# Patient Record
Sex: MALE | Race: ASIAN | Employment: FULL TIME | URBAN - METROPOLITAN AREA
[De-identification: names, ages, dates, MRNs, and addresses within clinical notes are randomized per-mention and may not be internally consistent; named-entity substitution may affect disease eponyms.]

---

## 2021-06-11 ENCOUNTER — OFFICE VISIT (OUTPATIENT)
Dept: PRIMARY CARE CLINIC | Age: 41
End: 2021-06-11
Payer: COMMERCIAL

## 2021-06-11 VITALS
TEMPERATURE: 98.4 F | HEART RATE: 72 BPM | SYSTOLIC BLOOD PRESSURE: 136 MMHG | WEIGHT: 158.2 LBS | RESPIRATION RATE: 15 BRPM | DIASTOLIC BLOOD PRESSURE: 84 MMHG | HEIGHT: 68 IN | BODY MASS INDEX: 23.98 KG/M2 | OXYGEN SATURATION: 98 %

## 2021-06-11 DIAGNOSIS — E78.2 MIXED HYPERLIPIDEMIA: Primary | ICD-10-CM

## 2021-06-11 DIAGNOSIS — E55.9 VITAMIN D DEFICIENCY: ICD-10-CM

## 2021-06-11 DIAGNOSIS — R03.0 ELEVATED BLOOD PRESSURE READING WITHOUT DIAGNOSIS OF HYPERTENSION: ICD-10-CM

## 2021-06-11 DIAGNOSIS — Z00.00 PHYSICAL EXAM: ICD-10-CM

## 2021-06-11 DIAGNOSIS — Z11.59 NEED FOR HEPATITIS C SCREENING TEST: ICD-10-CM

## 2021-06-11 PROCEDURE — 99204 OFFICE O/P NEW MOD 45 MIN: CPT | Performed by: INTERNAL MEDICINE

## 2021-06-11 NOTE — PROGRESS NOTES
Chief Complaint   Patient presents with   [facility] Patient       Visit Vitals  BP (!) 148/94 (BP 1 Location: Right arm)   Pulse 72   Temp 98.4 °F (36.9 °C)   Resp 15   Ht 5' 8\" (1.727 m)   Wt 158 lb 3.2 oz (71.8 kg)   SpO2 98%   BMI 24.05 kg/m²       1. Have you been to the ER, urgent care clinic since your last visit? Hospitalized since your last visit? No    2. Have you seen or consulted any other health care providers outside of the [address] [place] [address] since your last visit? Include any pap smears or colon screening.  No

## 2021-07-02 ENCOUNTER — OFFICE VISIT (OUTPATIENT)
Dept: PRIMARY CARE CLINIC | Age: 41
End: 2021-07-02
Payer: COMMERCIAL

## 2021-07-02 VITALS
SYSTOLIC BLOOD PRESSURE: 136 MMHG | RESPIRATION RATE: 14 BRPM | HEIGHT: 68 IN | TEMPERATURE: 97.8 F | BODY MASS INDEX: 22.97 KG/M2 | DIASTOLIC BLOOD PRESSURE: 85 MMHG | WEIGHT: 151.6 LBS | OXYGEN SATURATION: 99 % | HEART RATE: 65 BPM

## 2021-07-02 DIAGNOSIS — E03.8 SUBCLINICAL HYPOTHYROIDISM: ICD-10-CM

## 2021-07-02 DIAGNOSIS — E55.9 VITAMIN D DEFICIENCY: ICD-10-CM

## 2021-07-02 DIAGNOSIS — D64.9 ANEMIA, UNSPECIFIED TYPE: ICD-10-CM

## 2021-07-02 DIAGNOSIS — E78.2 MIXED HYPERLIPIDEMIA: Primary | ICD-10-CM

## 2021-07-02 DIAGNOSIS — Z23 NEED FOR DIPHTHERIA-TETANUS-PERTUSSIS (TDAP) VACCINE: ICD-10-CM

## 2021-07-02 PROCEDURE — 99214 OFFICE O/P EST MOD 30 MIN: CPT | Performed by: INTERNAL MEDICINE

## 2021-07-02 NOTE — PROGRESS NOTES
Chief Complaint   Patient presents with    Follow-up     abnormal lad results       Visit Vitals  BP (!) 149/85 (BP 1 Location: Left arm)   Pulse 65   Temp 97.8 °F (36.6 °C)   Resp 14   Ht 5' 8\" (1.727 m)   Wt 151 lb 9.6 oz (68.8 kg)   SpO2 99%   BMI 23.05 kg/m²       1. Have you been to the ER, urgent care clinic since your last visit? Hospitalized since your last visit? No    2. Have you seen or consulted any other health care providers outside of the [address] [place] [address] since your last visit? Include any pap smears or colon screening.  No

## 2021-07-02 NOTE — PROGRESS NOTES
Marnie Baxter (: 1980) is a 47 y.o. male, established patient, here for evaluation of the following chief complaint(s):  Follow-up (abnormal lad results)   Written by Ness Abraham, as dictated by Dr. Christine Forbes MD.      ASSESSMENT/PLAN:  Below is the assessment and plan developed based on review of pertinent history, physical exam, labs, studies, and medications. 1. Mixed hyperlipidemia  Explained that hypothyroidism can cause elevated cholesterol levels. Will check lipid panel in 2 months. Waiting for results. -     LIPID PANEL; Future    2. Anemia, unspecified type  I ordered a stool test for health maintenance. Ordered work to check CBC levels. Waiting for results. -     OCCULT BLOOD IMMUNOASSAY,DIAGNOSTIC; Future  -     CBC W/O DIFF; Future    3. Vitamin D deficiency  Lab work showed low Vitamin D levels. Recommend that patient take a Vitamin D supplement of 2,000 units daily. -     VITAMIN D, 25 HYDROXY; Future    4. Subclinical hypothyroidism  Explained that hypothyroidism can cause elevated cholesterol levels. Will check lipid panel and TSH levels in 2 months. Waiting for results.   -     TSH 3RD GENERATION; Future    5. Need for diphtheria-tetanus-pertussis (Tdap) vaccine  Ordered Tdap vaccine. Will administer at pharmacy. -     diphth,pertus,acell,,tetanus (BOOSTRIX TDAP) 2.5-8-5 Lf-mcg-Lf/0.5mL susp suspension; 0.5 mL by IntraMUSCular route once for 1 dose., Normal, Disp-0.5 mL, R-0 sent to pharmacy. SUBJECTIVE/OBJECTIVE:  HPI  Patient presents today for a routine visit. His BP today is 149/85 and repeat reading came 136/85. He checks his BP at home, and reports readings in 585O systolic with occassional high readings. His most recent lab work showed Vitamin D level at 12.9, LDL at 126.6, HGB at 12.0, and TSH at 4.44. He does not have a fhx of hypothyroidism. He has started running recently for exercise. He is not a vegetarian, and eats mainly chicken for meat.  He denies having fatigued during the day, and sleeps around 9-10 pm.         Past Medical History:   Diagnosis Date    Pneumothorax 2006       History reviewed. No pertinent surgical history. Family History   Problem Relation Age of Onset    Hypertension Mother     Cancer Father         bone cancer    Hypertension Father        Social History     Socioeconomic History    Marital status:      Spouse name: Not on file    Number of children: Not on file    Years of education: Not on file    Highest education level: Not on file   Occupational History    Not on file   Tobacco Use    Smoking status: Former Smoker     Types: Cigarettes    Smokeless tobacco: Never Used   Vaping Use    Vaping Use: Never used   Substance and Sexual Activity    Alcohol use: Yes    Drug use: Never    Sexual activity: Not on file   Other Topics Concern    Not on file   Social History Narrative    Not on file     Social Determinants of Health     Financial Resource Strain:     Difficulty of Paying Living Expenses:    Food Insecurity:     Worried About Running Out of Food in the Last Year:     [address] in the Last Year:    Transportation Needs:     Lack of Transportation (Medical):      Lack of Transportation (Non-Medical):    Physical Activity:     Days of Exercise per Week:     Minutes of Exercise per Session:    Stress:     Feeling of Stress :    Social Connections:     Frequency of Communication with Friends and Family:     Frequency of Social Gatherings with Friends and Family:     Attends Yazidism Services:     Active Member of Clubs or Organizations:     Attends Club or Organization Meetings:     Marital Status:    Intimate Partner Violence:     Fear of Current or Ex-Partner:     Emotionally Abused:     Physically Abused:     Sexually Abused:        Orders Only on 06/18/2021   Component Date Value Ref Range Status    Hep C virus Ab Interp. 06/18/2021 NONREACTIVE  NONREACTIVE   Final    Hep C  virus Ab comment 06/18/2021 Method used is Siemens Advia Centaur    Final    Vitamin D 25-Hydroxy 06/18/2021 12.9* 30 - 100 ng/mL Final    Comment: (NOTE)  Deficiency               <20 ng/mL  Insufficiency          20-30 ng/mL  Sufficient             ng/mL  Possible toxicity       >100 ng/mL    The Method used is Siemens Advia Centaur currently standardized to a   Center of Disease Control and Prevention (CDC) certified reference   [facility]. Samples containing fluorescein dye can produce falsely   elevated values when tested with the ADVIA Centaur Vitamin D Assay. It is recommended that results in the toxic range, >100 ng/mL, be   retested 72 hours post fluorescein exposure.  Sodium 06/18/2021 142  136 - 145 mmol/L Final    Potassium 06/18/2021 4.1  3.5 - 5.1 mmol/L Final    Chloride 06/18/2021 106  97 - 108 mmol/L Final    CO2 06/18/2021 27  21 - 32 mmol/L Final    Anion gap 06/18/2021 9  5 - 15 mmol/L Final    Glucose 06/18/2021 95  65 - 100 mg/dL Final    BUN 06/18/2021 12  6 - 20 MG/DL Final    Creatinine 06/18/2021 1.17  0.70 - 1.30 MG/DL Final    BUN/Creatinine ratio 06/18/2021 10* 12 - 20   Final    GFR est AA 06/18/2021 >60  >60 ml/min/1.73m2 Final    GFR est non-AA 06/18/2021 >60  >60 ml/min/1.73m2 Final    Comment: Estimated GFR is calculated using the IDMS-traceable Modification of Diet in  Renal Disease (MDRD) Study equation, reported for both  Americans  (GFRAA) and non- Americans (GFRNA), and normalized to 1.73m2 body  surface area. The physician must decide which value applies to the patient.  Calcium 06/18/2021 9.1  8.5 - 10.1 MG/DL Final    Bilirubin, total 06/18/2021 0.8  0.2 - 1.0 MG/DL Final    ALT (SGPT) 06/18/2021 25  12 - 78 U/L Final    AST (SGOT) 06/18/2021 18  15 - 37 U/L Final    Alk.  phosphatase 06/18/2021 134* 45 - 117 U/L Final    Protein, total 06/18/2021 7.3  6.4 - 8.2 g/dL Final    Albumin 06/18/2021 4.4  3.5 - 5.0 g/dL Final    Globulin 06/18/2021 2.9  2.0 - 4.0 g/dL Final    A-G Ratio 06/18/2021 1.5  1.1 - 2.2   Final    WBC 06/18/2021 5.5  4.1 - 11.1 K/uL Final    RBC 06/18/2021 4.99  4. 10 - 5.70 M/uL Final    HGB 06/18/2021 12.0* 12.1 - 17.0 g/dL Final    HCT 06/18/2021 39.9  36.6 - 50.3 % Final    MCV 06/18/2021 80.0  80.0 - 99.0 FL Final    MCH 06/18/2021 24.0* 26.0 - 34.0 PG Final    MCHC 06/18/2021 30.1  30.0 - 36.5 g/dL Final    RDW 06/18/2021 13.6  11.5 - 14.5 % Final    PLATELET 08/70/5862 156  150 - 400 K/uL Final    MPV 06/18/2021 12.3  8.9 - 12.9 FL Final    NRBC 06/18/2021 0.0  0  WBC Final    ABSOLUTE NRBC 06/18/2021 0.00  0.00 - 0.01 K/uL Final    NEUTROPHILS 06/18/2021 60  32 - 75 % Final    LYMPHOCYTES 06/18/2021 30  12 - 49 % Final    MONOCYTES 06/18/2021 7  5 - 13 % Final    EOSINOPHILS 06/18/2021 3  0 - 7 % Final    BASOPHILS 06/18/2021 0  0 - 1 % Final    IMMATURE GRANULOCYTES 06/18/2021 0  0.0 - 0.5 % Final    ABS. NEUTROPHILS 06/18/2021 3.3  1.8 - 8.0 K/UL Final    ABS. LYMPHOCYTES 06/18/2021 1.6  0.8 - 3.5 K/UL Final    ABS. MONOCYTES 06/18/2021 0.4  0.0 - 1.0 K/UL Final    ABS. EOSINOPHILS 06/18/2021 0.1  0.0 - 0.4 K/UL Final    ABS. BASOPHILS 06/18/2021 0.0  0.0 - 0.1 K/UL Final    ABS. IMM. GRANS. 06/18/2021 0.0  0.00 - 0.04 K/UL Final    DF 06/18/2021 AUTOMATED    Final    Cholesterol, total 06/18/2021 192  <200 MG/DL Final    Triglyceride 06/18/2021 127  <150 MG/DL Final    Comment: Based on NCEP-ATP III:  Triglycerides <150 mg/dL  is considered normal, 150-199  mg/dL  borderline high,  200-499 mg/dL high and  greater than or equal to 500  mg/dL very high.  HDL Cholesterol 06/18/2021 40  MG/DL Final    Comment: Based on NCEP ATP III, HDL Cholesterol <40 mg/dL is considered low and >60  mg/dL is elevated.       LDL, calculated 06/18/2021 126.6* 0 - 100 MG/DL Final    Comment: Based on the NCEP-ATP: LDL-C concentrations are considered  optimal <100 mg/dL,  near optimal/above Normal 100-129 mg/dL Borderline High: 130-159, High: 160-189  mg/dL Very High: Greater than or equal to 190 mg/dL      VLDL, calculated 06/18/2021 25.4  MG/DL Final    CHOL/HDL Ratio 06/18/2021 4.8  0.0 - 5.0   Final    TSH 06/18/2021 4.44* 0.36 - 3.74 uIU/mL Final    Comment:   Due to TSH heterogeneity, both structurally and degree of glycosylation,  monoclonal antibodies used in the TSH assay may not accurately quantitate TSH. Therefore, this result should be correlated with clinical findings as well as  with other assessments of thyroid function, e.g., free T4, free T3. Review of Systems   Constitutional: Negative for activity change, fatigue and unexpected weight change. HENT: Negative for congestion, ear discharge, ear pain, hearing loss, rhinorrhea and sore throat. Eyes: Negative for pain, discharge and redness. Respiratory: Negative for cough, chest tightness and shortness of breath. Cardiovascular: Negative for leg swelling. Gastrointestinal: Negative for abdominal pain, constipation and diarrhea. Endocrine: Negative for polyuria. Genitourinary: Negative for dysuria, flank pain and urgency. Musculoskeletal: Negative for arthralgias, back pain and myalgias. Skin: Negative for color change. Neurological: Negative for dizziness, light-headedness and headaches. Psychiatric/Behavioral: Negative for dysphoric mood and sleep disturbance. The patient is not nervous/anxious. Visit Vitals  BP (!) 149/85 (BP 1 Location: Left arm)   Pulse 65   Temp 97.8 °F (36.6 °C)   Resp 14   Ht 5' 8\" (1.727 m)   Wt 151 lb 9.6 oz (68.8 kg)   SpO2 99%   BMI 23.05 kg/m²      Physical Exam  Vitals and nursing note reviewed. Constitutional:       General: He is not in acute distress. Appearance: Normal appearance. He is well-developed. He is not diaphoretic. HENT:      Head: Normocephalic and atraumatic.       Right Ear: External ear normal.      Left Ear: External ear normal. Mouth/Throat:      Mouth: Mucous membranes are moist.      Pharynx: Oropharynx is clear. Eyes:      General: No scleral icterus. Right eye: No discharge. Left eye: No discharge. Extraocular Movements: Extraocular movements intact. Conjunctiva/sclera: Conjunctivae normal.      Pupils: Pupils are equal, round, and reactive to light. Cardiovascular:      Rate and Rhythm: Normal rate and regular rhythm. Pulses: Normal pulses. Heart sounds: Normal heart sounds. Pulmonary:      Effort: Pulmonary effort is normal.      Breath sounds: Normal breath sounds. No wheezing. Musculoskeletal:      Right lower leg: No edema. Left lower leg: No edema. Neurological:      Mental Status: He is alert and oriented to person, place, and time. Psychiatric:         Mood and Affect: Mood and affect normal.         An electronic signature was used to authenticate this note.   -- Ferrell Cockayne

## 2022-12-06 ENCOUNTER — OFFICE VISIT (OUTPATIENT)
Dept: PRIMARY CARE CLINIC | Age: 42
End: 2022-12-06
Payer: COMMERCIAL

## 2022-12-06 VITALS
RESPIRATION RATE: 18 BRPM | HEIGHT: 68 IN | SYSTOLIC BLOOD PRESSURE: 138 MMHG | BODY MASS INDEX: 24.07 KG/M2 | OXYGEN SATURATION: 97 % | WEIGHT: 158.8 LBS | DIASTOLIC BLOOD PRESSURE: 92 MMHG | TEMPERATURE: 97.5 F | HEART RATE: 78 BPM

## 2022-12-06 DIAGNOSIS — E55.9 VITAMIN D DEFICIENCY: ICD-10-CM

## 2022-12-06 DIAGNOSIS — D50.8 OTHER IRON DEFICIENCY ANEMIA: ICD-10-CM

## 2022-12-06 DIAGNOSIS — R06.09 DYSPNEA ON EXERTION: ICD-10-CM

## 2022-12-06 DIAGNOSIS — Z23 NEEDS FLU SHOT: ICD-10-CM

## 2022-12-06 DIAGNOSIS — I10 PRIMARY HYPERTENSION: Primary | ICD-10-CM

## 2022-12-06 DIAGNOSIS — E03.8 SUBCLINICAL HYPOTHYROIDISM: ICD-10-CM

## 2022-12-06 PROCEDURE — 90471 IMMUNIZATION ADMIN: CPT | Performed by: INTERNAL MEDICINE

## 2022-12-06 PROCEDURE — 99214 OFFICE O/P EST MOD 30 MIN: CPT | Performed by: INTERNAL MEDICINE

## 2022-12-06 PROCEDURE — 3079F DIAST BP 80-89 MM HG: CPT | Performed by: INTERNAL MEDICINE

## 2022-12-06 PROCEDURE — 3075F SYST BP GE 130 - 139MM HG: CPT | Performed by: INTERNAL MEDICINE

## 2022-12-06 PROCEDURE — 90686 IIV4 VACC NO PRSV 0.5 ML IM: CPT | Performed by: INTERNAL MEDICINE

## 2022-12-06 RX ORDER — AMLODIPINE BESYLATE 5 MG/1
5 TABLET ORAL DAILY
Qty: 30 TABLET | Refills: 0 | Status: SHIPPED | OUTPATIENT
Start: 2022-12-06 | End: 2023-01-05

## 2022-12-06 NOTE — PROGRESS NOTES
1. \"Have you been to the ER, urgent care clinic since your last visit? Hospitalized since your last visit? \" No    2. \"Have you seen or consulted any other health care providers outside of the [address] [place] [address] since your last visit? \" No     3. For patients aged 39-70: Has the patient had a colonoscopy / FIT/ Cologuard?  NA - based on age      Chief Complaint   Patient presents with    Follow-up     BP

## 2022-12-06 NOTE — PROGRESS NOTES
Aury Baxter (: 1980) is a 38 y.o. male, established patient, here for evaluation of the following chief complaint(s):  Follow-up (BP)       ASSESSMENT/PLAN:  Below is the assessment and plan developed based on review of pertinent history, physical exam, labs, studies, and medications. 1. Primary hypertension  -     METABOLIC PANEL, COMPREHENSIVE; Future  -     LIPID PANEL; Future  -     amLODIPine (NORVASC) 5 mg tablet; Take 1 Tablet by mouth daily for 30 days. , Normal, Disp-30 Tablet, R-0 sent to pharmacy. I ordered a CMP and a lipid panel. I prescribed amlodipine 5 mg to be taken daily. Potential side effects were discussed. I recommend that he check his BP on a regular basis at home as he has family history of high BP and enter readings into [employer]. We can reevaluate amlodipine in one month. Exercise was also recommend. 2. Subclinical hypothyroidism  -     TSH 3RD GENERATION; Future  I ordered blood work to check his TSH level. 3. Vitamin D deficiency  -     VITAMIN D, 25 HYDROXY; Future  Ordered lab work to check Vitamin D levels. Waiting for results. Recommend that patient take a Vitamin D supplement of 1,000 units daily. 4. Other iron deficiency anemia  -     CBC W/O DIFF; Future  I ordered a CBC. 5. Needs flu shot  -     INFLUENZA, FLUARIX, FLULAVAL, FLUZONE (AGE 6 MO+), AFLURIA(AGE 3Y+) IM, PF, 0.5 ML  Flu shot administered in office. 6. Dyspnea on exertion  He experiences SOB when performing physical tasks. We discussed the importance of exercise and be careful about lifting weigh due to the history of Pneumothorax. SUBJECTIVE/OBJECTIVE:  HPI  Patient presents today for a follow up concerning high BP. He is not fasting today. He has not been frequently checking at home, but checks at grocery stores, and states that his diastolic pressure can be in the upper 80s or lower 90s.   Last week he checked his BP at [employer] and it measured 148/90s after having received COVID shot. He did not experience symptoms from vaccine, except had head ache and light dizziness but had also not had his usual coffee. He denied any change in vision. His BP is elevated today in office at 140/90, 138/92 on manual repeat in left arm while sitting down. He gets SOB when doing physical exertion. He denies constipation. He drinks alcohol regularly but does not smoke. He had eye exam last month. He had prescription decreased. He is taking vitamin d supplement. He has family history of HTN  He has not been physically active in last 6 months. His father had multiple myeloma but patient had no family history of colon or prostate cancer. He had not had flu shot this year, but my have had flu due to kids being flu positive. He received COVID 2nd booster recently. His Tdap is UTD. Current Outpatient Medications on File Prior to Visit   Medication Sig Dispense Refill    cholecalciferol, vitamin D3, (VITAMIN D3 PO) Take  by mouth.      calcium carbonate (TUMS PO) Take  by mouth. No current facility-administered medications on file prior to visit. No Known Allergies    Past Medical History:   Diagnosis Date    Pneumothorax 2006       History reviewed. No pertinent surgical history.     Family History   Problem Relation Age of Onset    Hypertension Mother     Cancer Father         bone cancer    Hypertension Father        Social History     Socioeconomic History    Marital status:      Spouse name: Not on file    Number of children: Not on file    Years of education: Not on file    Highest education level: Not on file   Occupational History    Not on file   Tobacco Use    Smoking status: Former     Types: Cigarettes    Smokeless tobacco: Never   Vaping Use    Vaping Use: Never used   Substance and Sexual Activity    Alcohol use: Yes    Drug use: Never    Sexual activity: Not on file   Other Topics Concern    Not on file   Social History Narrative    Not on file     Social Determinants of Health     Financial Resource Strain: Low Risk     Difficulty of Paying Living Expenses: Not hard at all   Food Insecurity: No Food Insecurity    Worried About Running Out of Food in the Last Year: Never true    Ran Out of Food in the Last Year: Never true   Transportation Needs: Not on file   Physical Activity: Not on file   Stress: Not on file   Social Connections: Not on file   Intimate Partner Violence: Not on file   Housing Stability: Not on file       No visits with results within 3 Month(s) from this visit. Latest known visit with results is:   Orders Only on 06/18/2021   Component Date Value Ref Range Status    Hep C virus Ab Interp. 06/18/2021 NONREACTIVE  NONREACTIVE   Final    Hep C  virus Ab comment 06/18/2021 Method used is [place] [employer]    Final    Vitamin D 25-Hydroxy 06/18/2021 12.9 (A)  30 - 100 ng/mL Final    Comment: (NOTE)  Deficiency               <20 ng/mL  Insufficiency          20-30 ng/mL  Sufficient             ng/mL  Possible toxicity       >100 ng/mL    The Method used is Siemens Advia Centaur currently standardized to a   Center of Disease Control and Prevention (CDC) certified reference   [address]. Samples containing fluorescein dye can produce falsely   elevated values when tested with the ADVIA Centaur Vitamin D Assay. It is recommended that results in the toxic range, >100 ng/mL, be   retested 72 hours post fluorescein exposure.       Sodium 06/18/2021 142  136 - 145 mmol/L Final    Potassium 06/18/2021 4.1  3.5 - 5.1 mmol/L Final    Chloride 06/18/2021 106  97 - 108 mmol/L Final    CO2 06/18/2021 27  21 - 32 mmol/L Final    Anion gap 06/18/2021 9  5 - 15 mmol/L Final    Glucose 06/18/2021 95  65 - 100 mg/dL Final    BUN 06/18/2021 12  6 - 20 MG/DL Final    Creatinine 06/18/2021 1.17  0.70 - 1.30 MG/DL Final    BUN/Creatinine ratio 06/18/2021 10 (A)  12 - 20   Final    GFR est AA 06/18/2021 >60  >60 ml/min/1.73m2 Final    GFR est non-AA 06/18/2021 >60 >60 ml/min/1.73m2 Final    Comment: Estimated GFR is calculated using the IDMS-traceable Modification of Diet in  Renal Disease (MDRD) Study equation, reported for both  Americans  (GFRAA) and non- Americans (GFRNA), and normalized to 1.73m2 body  surface area. The physician must decide which value applies to the patient. Calcium 06/18/2021 9.1  8.5 - 10.1 MG/DL Final    Bilirubin, total 06/18/2021 0.8  0.2 - 1.0 MG/DL Final    ALT (SGPT) 06/18/2021 25  12 - 78 U/L Final    AST (SGOT) 06/18/2021 18  15 - 37 U/L Final    Alk. phosphatase 06/18/2021 134 (A)  45 - 117 U/L Final    Protein, total 06/18/2021 7.3  6.4 - 8.2 g/dL Final    Albumin 06/18/2021 4.4  3.5 - 5.0 g/dL Final    Globulin 06/18/2021 2.9  2.0 - 4.0 g/dL Final    A-G Ratio 06/18/2021 1.5  1.1 - 2.2   Final    WBC 06/18/2021 5.5  4.1 - 11.1 K/uL Final    RBC 06/18/2021 4.99  4. 10 - 5.70 M/uL Final    HGB 06/18/2021 12.0 (A)  12.1 - 17.0 g/dL Final    HCT 06/18/2021 39.9  36.6 - 50.3 % Final    MCV 06/18/2021 80.0  80.0 - 99.0 FL Final    MCH 06/18/2021 24.0 (A)  26.0 - 34.0 PG Final    MCHC 06/18/2021 30.1  30.0 - 36.5 g/dL Final    RDW 06/18/2021 13.6  11.5 - 14.5 % Final    PLATELET 69/65/9172 673  150 - 400 K/uL Final    MPV 06/18/2021 12.3  8.9 - 12.9 FL Final    NRBC 06/18/2021 0.0  0  WBC Final    ABSOLUTE NRBC 06/18/2021 0.00  0.00 - 0.01 K/uL Final    NEUTROPHILS 06/18/2021 60  32 - 75 % Final    LYMPHOCYTES 06/18/2021 30  12 - 49 % Final    MONOCYTES 06/18/2021 7  5 - 13 % Final    EOSINOPHILS 06/18/2021 3  0 - 7 % Final    BASOPHILS 06/18/2021 0  0 - 1 % Final    IMMATURE GRANULOCYTES 06/18/2021 0  0.0 - 0.5 % Final    ABS. NEUTROPHILS 06/18/2021 3.3  1.8 - 8.0 K/UL Final    ABS. LYMPHOCYTES 06/18/2021 1.6  0.8 - 3.5 K/UL Final    ABS. MONOCYTES 06/18/2021 0.4  0.0 - 1.0 K/UL Final    ABS. EOSINOPHILS 06/18/2021 0.1  0.0 - 0.4 K/UL Final    ABS. BASOPHILS 06/18/2021 0.0  0.0 - 0.1 K/UL Final    ABS. IMM. GRANS. 06/18/2021 0.0  0.00 - 0.04 K/UL Final    DF 06/18/2021 AUTOMATED    Final    Cholesterol, total 06/18/2021 192  <200 MG/DL Final    Triglyceride 06/18/2021 127  <150 MG/DL Final    Comment: Based on NCEP-ATP III:  Triglycerides <150 mg/dL  is considered normal, 150-199  mg/dL  borderline high,  200-499 mg/dL high and  greater than or equal to 500  mg/dL very high. HDL Cholesterol 06/18/2021 40  MG/DL Final    Comment: Based on NCEP ATP III, HDL Cholesterol <40 mg/dL is considered low and >60  mg/dL is elevated. LDL, calculated 06/18/2021 126.6 (A)  0 - 100 MG/DL Final    Comment: Based on the NCEP-ATP: LDL-C concentrations are considered  optimal <100 mg/dL,  near optimal/above Normal 100-129 mg/dL Borderline High: 130-159, High: 160-189  mg/dL Very High: Greater than or equal to 190 mg/dL      VLDL, calculated 06/18/2021 25.4  MG/DL Final    CHOL/HDL Ratio 06/18/2021 4.8  0.0 - 5.0   Final    TSH 06/18/2021 4.44 (A)  0.36 - 3.74 uIU/mL Final    Comment:   Due to TSH heterogeneity, both structurally and degree of glycosylation,  monoclonal antibodies used in the TSH assay may not accurately quantitate TSH. Therefore, this result should be correlated with clinical findings as well as  with other assessments of thyroid function, e.g., free T4, free T3. Review of Systems   Constitutional:  Negative for activity change, fatigue and unexpected weight change. HENT:  Negative for congestion, ear discharge, ear pain, hearing loss, rhinorrhea and sore throat. Eyes:  Negative for pain, discharge and redness. Respiratory:  Positive for shortness of breath. Negative for cough and chest tightness. Cardiovascular:  Negative for leg swelling. Gastrointestinal:  Negative for abdominal pain, constipation and diarrhea. Endocrine: Negative for polyuria. Genitourinary:  Negative for dysuria, flank pain and urgency. Musculoskeletal:  Negative for arthralgias, back pain and myalgias.    Skin: Negative for color change. Neurological:  Negative for dizziness, light-headedness and headaches. Psychiatric/Behavioral:  Negative for dysphoric mood and sleep disturbance. The patient is not nervous/anxious. Visit Vitals  BP (!) 138/92 (BP 1 Location: Left upper arm, BP Patient Position: Sitting)   Pulse 78   Temp 97.5 °F (36.4 °C) (Temporal)   Resp 18   Ht 5' 8\" (1.727 m)   Wt 158 lb 12.8 oz (72 kg)   SpO2 97%   BMI 24.15 kg/m²       Physical Exam  Vitals and nursing note reviewed. Constitutional:       General: He is not in acute distress. Appearance: Normal appearance. He is well-developed. He is not diaphoretic. HENT:      Head: Normocephalic and atraumatic. Right Ear: External ear normal.      Left Ear: External ear normal.      Mouth/Throat:      Mouth: Mucous membranes are moist.      Pharynx: Oropharynx is clear. Eyes:      General: No scleral icterus. Right eye: No discharge. Left eye: No discharge. Extraocular Movements: Extraocular movements intact. Conjunctiva/sclera: Conjunctivae normal.      Pupils: Pupils are equal, round, and reactive to light. Cardiovascular:      Rate and Rhythm: Normal rate and regular rhythm. Pulses: Normal pulses. Heart sounds: Normal heart sounds. Pulmonary:      Effort: Pulmonary effort is normal.      Breath sounds: Normal breath sounds. No wheezing. Musculoskeletal:      Right lower leg: No edema. Left lower leg: No edema. Neurological:      Mental Status: He is alert and oriented to person, place, and time. Psychiatric:         Mood and Affect: Mood and affect normal.         I, Octavia Asthon MD, personally performed the services described in this documentation as scribed by Bo Sequeira in my presence, and it is both accurate and complete. An electronic signature was used to authenticate this note.   -- Bo Sequeira

## 2023-01-12 DIAGNOSIS — I10 PRIMARY HYPERTENSION: ICD-10-CM

## 2023-01-12 RX ORDER — AMLODIPINE BESYLATE 5 MG/1
TABLET ORAL
Qty: 30 TABLET | Refills: 0 | Status: SHIPPED | OUTPATIENT
Start: 2023-01-12

## 2023-05-15 DIAGNOSIS — I10 ESSENTIAL (PRIMARY) HYPERTENSION: Primary | ICD-10-CM

## 2023-05-15 RX ORDER — AMLODIPINE BESYLATE 5 MG/1
5 TABLET ORAL DAILY
Qty: 90 TABLET | Refills: 0 | Status: SHIPPED | OUTPATIENT
Start: 2023-05-15 | End: 2023-08-13

## 2023-05-21 DIAGNOSIS — I10 ESSENTIAL (PRIMARY) HYPERTENSION: ICD-10-CM

## 2023-05-22 RX ORDER — AMLODIPINE BESYLATE 5 MG/1
5 TABLET ORAL DAILY
Qty: 90 TABLET | Refills: 0 | OUTPATIENT
Start: 2023-05-22 | End: 2023-08-20

## 2023-09-05 DIAGNOSIS — I10 ESSENTIAL (PRIMARY) HYPERTENSION: ICD-10-CM

## 2023-09-05 RX ORDER — AMLODIPINE BESYLATE 5 MG/1
5 TABLET ORAL DAILY
Qty: 90 TABLET | Refills: 0 | Status: SHIPPED | OUTPATIENT
Start: 2023-09-05 | End: 2023-12-04

## 2023-12-14 DIAGNOSIS — I10 ESSENTIAL (PRIMARY) HYPERTENSION: ICD-10-CM

## 2023-12-14 RX ORDER — AMLODIPINE BESYLATE 5 MG/1
5 TABLET ORAL DAILY
Qty: 90 TABLET | Refills: 0 | Status: SHIPPED | OUTPATIENT
Start: 2023-12-14 | End: 2024-03-13

## 2024-04-04 DIAGNOSIS — I10 ESSENTIAL (PRIMARY) HYPERTENSION: ICD-10-CM

## 2024-04-05 DIAGNOSIS — I10 ESSENTIAL (PRIMARY) HYPERTENSION: ICD-10-CM

## 2024-04-05 RX ORDER — AMLODIPINE BESYLATE 5 MG/1
5 TABLET ORAL DAILY
Qty: 90 TABLET | OUTPATIENT
Start: 2024-04-05 | End: 2024-07-04

## 2024-04-05 RX ORDER — AMLODIPINE BESYLATE 5 MG/1
5 TABLET ORAL DAILY
Qty: 30 TABLET | Refills: 0 | Status: SHIPPED | OUTPATIENT
Start: 2024-04-05 | End: 2024-07-04

## 2024-05-14 DIAGNOSIS — I10 ESSENTIAL (PRIMARY) HYPERTENSION: ICD-10-CM

## 2024-05-14 RX ORDER — AMLODIPINE BESYLATE 5 MG/1
5 TABLET ORAL DAILY
Qty: 90 TABLET | OUTPATIENT
Start: 2024-05-14 | End: 2024-08-12

## 2024-06-02 SDOH — ECONOMIC STABILITY: HOUSING INSECURITY
IN THE LAST 12 MONTHS, WAS THERE A TIME WHEN YOU DID NOT HAVE A STEADY PLACE TO SLEEP OR SLEPT IN A SHELTER (INCLUDING NOW)?: NO

## 2024-06-02 SDOH — ECONOMIC STABILITY: FOOD INSECURITY: WITHIN THE PAST 12 MONTHS, THE FOOD YOU BOUGHT JUST DIDN'T LAST AND YOU DIDN'T HAVE MONEY TO GET MORE.: NEVER TRUE

## 2024-06-02 SDOH — ECONOMIC STABILITY: INCOME INSECURITY: HOW HARD IS IT FOR YOU TO PAY FOR THE VERY BASICS LIKE FOOD, HOUSING, MEDICAL CARE, AND HEATING?: NOT HARD AT ALL

## 2024-06-02 SDOH — ECONOMIC STABILITY: FOOD INSECURITY: WITHIN THE PAST 12 MONTHS, YOU WORRIED THAT YOUR FOOD WOULD RUN OUT BEFORE YOU GOT MONEY TO BUY MORE.: NEVER TRUE

## 2024-06-02 SDOH — ECONOMIC STABILITY: TRANSPORTATION INSECURITY
IN THE PAST 12 MONTHS, HAS LACK OF TRANSPORTATION KEPT YOU FROM MEETINGS, WORK, OR FROM GETTING THINGS NEEDED FOR DAILY LIVING?: NO

## 2024-06-05 ENCOUNTER — OFFICE VISIT (OUTPATIENT)
Dept: PRIMARY CARE CLINIC | Facility: CLINIC | Age: 44
End: 2024-06-05
Payer: COMMERCIAL

## 2024-06-05 VITALS
HEART RATE: 75 BPM | DIASTOLIC BLOOD PRESSURE: 92 MMHG | HEIGHT: 68 IN | OXYGEN SATURATION: 98 % | WEIGHT: 157 LBS | SYSTOLIC BLOOD PRESSURE: 146 MMHG | BODY MASS INDEX: 23.79 KG/M2 | RESPIRATION RATE: 18 BRPM | TEMPERATURE: 97.1 F

## 2024-06-05 DIAGNOSIS — E78.2 MIXED HYPERLIPIDEMIA: ICD-10-CM

## 2024-06-05 DIAGNOSIS — E03.8 SUBCLINICAL HYPOTHYROIDISM: ICD-10-CM

## 2024-06-05 DIAGNOSIS — E55.9 VITAMIN D DEFICIENCY: ICD-10-CM

## 2024-06-05 DIAGNOSIS — Z11.59 NEED FOR HEPATITIS B SCREENING TEST: ICD-10-CM

## 2024-06-05 DIAGNOSIS — I10 PRIMARY HYPERTENSION: Primary | ICD-10-CM

## 2024-06-05 DIAGNOSIS — R30.9 PAIN WITH URINATION: ICD-10-CM

## 2024-06-05 PROCEDURE — 3075F SYST BP GE 130 - 139MM HG: CPT | Performed by: INTERNAL MEDICINE

## 2024-06-05 PROCEDURE — 3078F DIAST BP <80 MM HG: CPT | Performed by: INTERNAL MEDICINE

## 2024-06-05 PROCEDURE — 99214 OFFICE O/P EST MOD 30 MIN: CPT | Performed by: INTERNAL MEDICINE

## 2024-06-05 RX ORDER — AMLODIPINE BESYLATE 5 MG/1
5 TABLET ORAL DAILY
Qty: 90 TABLET | Refills: 0 | Status: SHIPPED | OUTPATIENT
Start: 2024-06-05 | End: 2024-09-03

## 2024-06-05 ASSESSMENT — PATIENT HEALTH QUESTIONNAIRE - PHQ9
SUM OF ALL RESPONSES TO PHQ9 QUESTIONS 1 & 2: 0
SUM OF ALL RESPONSES TO PHQ QUESTIONS 1-9: 0
SUM OF ALL RESPONSES TO PHQ QUESTIONS 1-9: 0
2. FEELING DOWN, DEPRESSED OR HOPELESS: NOT AT ALL
SUM OF ALL RESPONSES TO PHQ QUESTIONS 1-9: 0
1. LITTLE INTEREST OR PLEASURE IN DOING THINGS: NOT AT ALL
SUM OF ALL RESPONSES TO PHQ QUESTIONS 1-9: 0

## 2024-06-05 ASSESSMENT — ENCOUNTER SYMPTOMS
BACK PAIN: 0
SORE THROAT: 0
SHORTNESS OF BREATH: 0
COUGH: 0
CONSTIPATION: 0

## 2024-06-05 NOTE — PROGRESS NOTES
ASSESSMENT and PLAN  Harvinder was seen today for medication refill.    Diagnoses and all orders for this visit:    Primary hypertension  BP today is elevated. He has been out of his medication for several weeks. I refilled his medication today. He continues with amlodipine 5mg daily. He will continue to monitor his BP at home. I recommended checking it 3x/week. He will bring the readings to his next office today. He will return another day for fasting labs.   -     Comprehensive Metabolic Panel; Future  -     amLODIPine (NORVASC) 5 MG tablet; Take 1 tablet by mouth daily    Subclinical hypothyroidism  TSH was 4.44 in 2021. He never had labs rechecked. He is asymptomatic. Will recheck labs.   -     CBC; Future  -     TSH; Future    Vitamin D deficiency  He continues with daily Vitamin D supplement. Will recheck labs  -     Vitamin D 25 Hydroxy; Future    Mixed hyperlipidemia  Presumed stable, will recheck lipid panel. He is not on statin therapy.   -     Lipid Panel; Future    Need for hepatitis B screening test  We will check Hep B antibodies to determine need for vaccine.   -     Hepatitis B Surface Antibody; Future    Pain with urination  Will check UA.   -     Urinalysis with Reflex to Culture; Future      USPSTF recommendations discussed with patient and patient agreed to screening test.       HISTORY OF PRESENT ILLNESS  Harvinder Mcmanus is a 43 y.o. male here today for follow up on chronic conditions and for medication refills. He hasn't been seen here since 2022. He is not fasting today (banana and coffee).     He is overall doing well. He plays pickle ball for exercise. He reports some ache in legs after playing pickle ball.     BP today was 136/72 and 146/92 on manual recheck. When he checks his BP at home/gym he reports systolic typically in 120s and diastolic typically 80s-90s. Pt has been taking amlodipine 5mg daily and reports he tolerates the medication well. However, he has been out of his medication

## 2024-06-05 NOTE — PROGRESS NOTES
\"Have you been to the ER, urgent care clinic since your last visit?  Hospitalized since your last visit?\"    NO      “Have you seen or consulted any other health care providers outside of [facility] since your last visit?”    NO        Chief Complaint   Patient presents with    Medication Refill     Pt hasn't had BP med in a month.     Pt is ok with scribe.

## 2024-09-22 DIAGNOSIS — I10 PRIMARY HYPERTENSION: ICD-10-CM

## 2024-09-22 RX ORDER — AMLODIPINE BESYLATE 5 MG/1
5 TABLET ORAL DAILY
Qty: 90 TABLET | Refills: 0 | Status: SHIPPED | OUTPATIENT
Start: 2024-09-22 | End: 2024-12-21

## 2025-01-05 DIAGNOSIS — I10 PRIMARY HYPERTENSION: ICD-10-CM

## 2025-01-07 DIAGNOSIS — I10 PRIMARY HYPERTENSION: ICD-10-CM

## 2025-01-07 RX ORDER — AMLODIPINE BESYLATE 5 MG/1
5 TABLET ORAL DAILY
Qty: 90 TABLET | OUTPATIENT
Start: 2025-01-07 | End: 2025-02-06

## 2025-01-07 RX ORDER — AMLODIPINE BESYLATE 5 MG/1
5 TABLET ORAL DAILY
Qty: 30 TABLET | Refills: 0 | Status: SHIPPED | OUTPATIENT
Start: 2025-01-07 | End: 2025-02-06

## 2025-02-10 DIAGNOSIS — I10 PRIMARY HYPERTENSION: ICD-10-CM

## 2025-02-10 RX ORDER — AMLODIPINE BESYLATE 5 MG/1
5 TABLET ORAL DAILY
Qty: 90 TABLET | Refills: 0 | Status: SHIPPED | OUTPATIENT
Start: 2025-02-10

## 2025-05-28 DIAGNOSIS — I10 PRIMARY HYPERTENSION: ICD-10-CM

## 2025-05-28 RX ORDER — AMLODIPINE BESYLATE 5 MG/1
5 TABLET ORAL DAILY
Qty: 30 TABLET | Refills: 0 | Status: SHIPPED | OUTPATIENT
Start: 2025-05-28

## 2025-06-25 DIAGNOSIS — I10 PRIMARY HYPERTENSION: ICD-10-CM

## 2025-06-25 RX ORDER — AMLODIPINE BESYLATE 5 MG/1
5 TABLET ORAL DAILY
Qty: 30 TABLET | Refills: 0 | OUTPATIENT
Start: 2025-06-25

## 2025-06-28 SDOH — ECONOMIC STABILITY: INCOME INSECURITY: IN THE LAST 12 MONTHS, WAS THERE A TIME WHEN YOU WERE NOT ABLE TO PAY THE MORTGAGE OR RENT ON TIME?: NO

## 2025-06-28 SDOH — ECONOMIC STABILITY: FOOD INSECURITY: WITHIN THE PAST 12 MONTHS, THE FOOD YOU BOUGHT JUST DIDN'T LAST AND YOU DIDN'T HAVE MONEY TO GET MORE.: NEVER TRUE

## 2025-06-28 SDOH — ECONOMIC STABILITY: FOOD INSECURITY: WITHIN THE PAST 12 MONTHS, YOU WORRIED THAT YOUR FOOD WOULD RUN OUT BEFORE YOU GOT MONEY TO BUY MORE.: NEVER TRUE

## 2025-06-28 SDOH — ECONOMIC STABILITY: TRANSPORTATION INSECURITY
IN THE PAST 12 MONTHS, HAS THE LACK OF TRANSPORTATION KEPT YOU FROM MEDICAL APPOINTMENTS OR FROM GETTING MEDICATIONS?: NO

## 2025-06-30 ENCOUNTER — OFFICE VISIT (OUTPATIENT)
Dept: PRIMARY CARE CLINIC | Facility: CLINIC | Age: 45
End: 2025-06-30
Payer: COMMERCIAL

## 2025-06-30 VITALS
WEIGHT: 155 LBS | SYSTOLIC BLOOD PRESSURE: 128 MMHG | HEIGHT: 68 IN | RESPIRATION RATE: 18 BRPM | HEART RATE: 84 BPM | BODY MASS INDEX: 23.49 KG/M2 | OXYGEN SATURATION: 98 % | TEMPERATURE: 97.1 F | DIASTOLIC BLOOD PRESSURE: 82 MMHG

## 2025-06-30 DIAGNOSIS — I10 PRIMARY HYPERTENSION: Primary | ICD-10-CM

## 2025-06-30 DIAGNOSIS — E03.8 SUBCLINICAL HYPOTHYROIDISM: ICD-10-CM

## 2025-06-30 DIAGNOSIS — Z11.59 NEED FOR HEPATITIS B SCREENING TEST: ICD-10-CM

## 2025-06-30 DIAGNOSIS — E78.2 COMBINED HYPERLIPIDEMIA: ICD-10-CM

## 2025-06-30 DIAGNOSIS — E55.9 VITAMIN D DEFICIENCY: ICD-10-CM

## 2025-06-30 PROCEDURE — 3074F SYST BP LT 130 MM HG: CPT | Performed by: INTERNAL MEDICINE

## 2025-06-30 PROCEDURE — 99214 OFFICE O/P EST MOD 30 MIN: CPT | Performed by: INTERNAL MEDICINE

## 2025-06-30 PROCEDURE — 3078F DIAST BP <80 MM HG: CPT | Performed by: INTERNAL MEDICINE

## 2025-06-30 RX ORDER — AMLODIPINE BESYLATE 5 MG/1
5 TABLET ORAL DAILY
Qty: 90 TABLET | Refills: 1 | Status: SHIPPED | OUTPATIENT
Start: 2025-06-30

## 2025-06-30 ASSESSMENT — PATIENT HEALTH QUESTIONNAIRE - PHQ9
1. LITTLE INTEREST OR PLEASURE IN DOING THINGS: NOT AT ALL
SUM OF ALL RESPONSES TO PHQ QUESTIONS 1-9: 0
2. FEELING DOWN, DEPRESSED OR HOPELESS: NOT AT ALL
SUM OF ALL RESPONSES TO PHQ QUESTIONS 1-9: 0

## 2025-06-30 ASSESSMENT — ENCOUNTER SYMPTOMS
COLOR CHANGE: 0
CHEST TIGHTNESS: 0
RHINORRHEA: 0
COUGH: 0
BACK PAIN: 0
CONSTIPATION: 0
SORE THROAT: 0
DIARRHEA: 0
EYE DISCHARGE: 0
SHORTNESS OF BREATH: 0
ABDOMINAL PAIN: 0

## 2025-06-30 NOTE — PROGRESS NOTES
Harvinder Mcmanus (:  1980) is a 44 y.o. male, Established patient, here for evaluation of the following chief complaint(s):  Medication Refill and Shoulder Problem      ASSESSMENT/PLAN:  1. Primary hypertension  -     CBC; Future  -     Comprehensive Metabolic Panel; Future  -     amLODIPine (NORVASC) 5 MG tablet; Take 1 tablet by mouth daily, Disp-90 tablet, R-1Normal sent to pharmacy.  I refilled amlodipine 5 mg to continue taking daily.  BP is well-controlled on current medication. No change to dosage at this time.  I ordered a CBC and a CMP.  2. Subclinical hypothyroidism  -     TSH; Future  I ordered blood work to measure TSH levels.  3. Vitamin D deficiency  -     Vitamin D 25 Hydroxy; Future  Ordered lab work to check Vitamin D levels. Waiting for results. Recommend that patient take a Vitamin D supplement of 1,000 units daily.  4. Combined hyperlipidemia  -     Lipid Panel; Future  I ordered a lipid panel.  I recommend that he continue exercising, staying well-hydrated, and monitoring his diet.  5. Need for hepatitis B screening test  -     Hepatitis B Surface Antibody; Future  Ordered Hepatitis B test. Waiting for results.    Labs will be completed when he is fasting.  USPTF recommendations discussed with patients; he has agreed to screening tests.        Subjective   SUBJECTIVE/OBJECTIVE:  HPI    Patient presents today for a follow-up on chronic conditions. He is not fasting this morning.    BP is well-controlled in office today at 119/72. 128/82 on manual repeat in left upper arm while sitting down. He takes amlodipine 5 mg daily and denies any side effects. Pt reports that his SBP is typically stable, though his DBP ranges from 80-90. He takes 3 readings per day and notes that his first reading of the day is typically the highest.     He stays physically active by playing pickleball and running. He notes a Fhx of HTN, but no other heart problems. Pt has been limiting his sodium intake since his

## 2025-06-30 NOTE — PROGRESS NOTES
Have you been to the ER, urgent care clinic since your last visit?  Hospitalized since your last visit?   NO      Have you seen or consulted any other health care providers outside our system since your last visit?   [facility]       Chief Complaint   Patient presents with    Medication Refill    Shoulder Problem       Pt is ok with scribe.